# Patient Record
Sex: MALE | Race: WHITE | NOT HISPANIC OR LATINO
[De-identification: names, ages, dates, MRNs, and addresses within clinical notes are randomized per-mention and may not be internally consistent; named-entity substitution may affect disease eponyms.]

---

## 2017-01-12 PROBLEM — Z00.00 ENCOUNTER FOR PREVENTIVE HEALTH EXAMINATION: Status: ACTIVE | Noted: 2017-01-12

## 2017-07-12 ENCOUNTER — APPOINTMENT (OUTPATIENT)
Dept: ORTHOPEDIC SURGERY | Facility: CLINIC | Age: 52
End: 2017-07-12

## 2017-07-12 VITALS — BODY MASS INDEX: 22.48 KG/M2 | WEIGHT: 157 LBS | HEIGHT: 70 IN

## 2017-07-12 DIAGNOSIS — Z78.9 OTHER SPECIFIED HEALTH STATUS: ICD-10-CM

## 2017-07-12 DIAGNOSIS — Z86.79 PERSONAL HISTORY OF OTHER DISEASES OF THE CIRCULATORY SYSTEM: ICD-10-CM

## 2017-07-12 DIAGNOSIS — Z86.39 PERSONAL HISTORY OF OTHER ENDOCRINE, NUTRITIONAL AND METABOLIC DISEASE: ICD-10-CM

## 2017-07-26 ENCOUNTER — APPOINTMENT (OUTPATIENT)
Dept: ORTHOPEDIC SURGERY | Facility: CLINIC | Age: 52
End: 2017-07-26

## 2017-09-06 ENCOUNTER — APPOINTMENT (OUTPATIENT)
Dept: ORTHOPEDIC SURGERY | Facility: CLINIC | Age: 52
End: 2017-09-06
Payer: OTHER MISCELLANEOUS

## 2017-09-06 PROCEDURE — 73070 X-RAY EXAM OF ELBOW: CPT | Mod: 26,LT

## 2017-09-06 PROCEDURE — 99213 OFFICE O/P EST LOW 20 MIN: CPT

## 2017-09-07 ENCOUNTER — TRANSCRIPTION ENCOUNTER (OUTPATIENT)
Age: 52
End: 2017-09-07

## 2019-04-01 ENCOUNTER — RECORD ABSTRACTING (OUTPATIENT)
Age: 54
End: 2019-04-01

## 2019-04-25 ENCOUNTER — RESULT REVIEW (OUTPATIENT)
Age: 54
End: 2019-04-25

## 2019-04-30 ENCOUNTER — RECORD ABSTRACTING (OUTPATIENT)
Age: 54
End: 2019-04-30

## 2019-04-30 DIAGNOSIS — I77.810 THORACIC AORTIC ECTASIA: ICD-10-CM

## 2019-04-30 DIAGNOSIS — Z80.9 FAMILY HISTORY OF MALIGNANT NEOPLASM, UNSPECIFIED: ICD-10-CM

## 2019-05-07 ENCOUNTER — RECORD ABSTRACTING (OUTPATIENT)
Age: 54
End: 2019-05-07

## 2019-05-07 DIAGNOSIS — Z78.9 OTHER SPECIFIED HEALTH STATUS: ICD-10-CM

## 2019-05-07 DIAGNOSIS — S60.212A CONTUSION OF LEFT WRIST, INITIAL ENCOUNTER: ICD-10-CM

## 2019-05-07 DIAGNOSIS — S60.211A CONTUSION OF RIGHT WRIST, INITIAL ENCOUNTER: ICD-10-CM

## 2019-05-07 DIAGNOSIS — S52.125D NONDISPLACED FRACTURE OF HEAD OF LEFT RADIUS, SUBSEQUENT ENCOUNTER FOR CLOSED FRACTURE WITH ROUTINE HEALING: ICD-10-CM

## 2019-05-08 ENCOUNTER — TRANSCRIPTION ENCOUNTER (OUTPATIENT)
Age: 54
End: 2019-05-08

## 2019-05-08 ENCOUNTER — APPOINTMENT (OUTPATIENT)
Dept: CARDIOLOGY | Facility: CLINIC | Age: 54
End: 2019-05-08
Payer: COMMERCIAL

## 2019-05-08 ENCOUNTER — NON-APPOINTMENT (OUTPATIENT)
Age: 54
End: 2019-05-08

## 2019-05-08 VITALS
BODY MASS INDEX: 21.9 KG/M2 | WEIGHT: 153 LBS | HEIGHT: 70 IN | DIASTOLIC BLOOD PRESSURE: 70 MMHG | SYSTOLIC BLOOD PRESSURE: 106 MMHG

## 2019-05-08 PROCEDURE — 99244 OFF/OP CNSLTJ NEW/EST MOD 40: CPT

## 2019-05-08 PROCEDURE — 93000 ELECTROCARDIOGRAM COMPLETE: CPT

## 2019-05-08 NOTE — DISCUSSION/SUMMARY
[FreeTextEntry1] : Problem list/impression/recommendation.\par \par Aortic dilatation.\par The findings have been stable. CT scan performed 2019 shows ascending aortic aneurysm measuring 4.3x4.3 cm. No significant change from 2017.\par His last echocardiogram was in October of 2018. 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 Note that he was intolerant of a beta blocker.\par \par Dyslipidemia.\par Excellent control. Continue Lipitor.

## 2019-05-08 NOTE — REASON FOR VISIT
[FreeTextEntry1] : Mr. KIYA ISRAEL presents for cardiovascular evaluation.\par \par His problem list includes:\par Hypertension\par Dyslipidemia\par Dilated ascending aorta.\par \par His primary care physician is Doctor English

## 2019-05-08 NOTE — HISTORY OF PRESENT ILLNESS
[FreeTextEntry1] : This 54-year-old male patient is seen in cardiovascular consultation. He is seen at the patient and his primary care physician's request\par \par His problem list is as noted above.\par \par Since his last examination he reports some medical interactions. He had a quite persistent upper respiratory infection over the winter that lasted a number of months. Accordingly he was not as active and exercising. He is eager to resume.\par \par He had a colonoscopy that he reports was "fine". 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

## 2019-05-08 NOTE — PHYSICAL EXAM
[Normal Appearance] : normal appearance [General Appearance - Well Developed] : well developed [Well Groomed] : well groomed [No Deformities] : no deformities [General Appearance - In No Acute Distress] : no acute distress [General Appearance - Well Nourished] : well nourished [Eyelids - No Xanthelasma] : the eyelids demonstrated no xanthelasmas [Normal Conjunctiva] : the conjunctiva exhibited no abnormalities [Normal Oral Mucosa] : normal oral mucosa [No Oral Cyanosis] : no oral cyanosis [No Oral Pallor] : no oral pallor [Normal Jugular Venous V Waves Present] : normal jugular venous V waves present [Normal Jugular Venous A Waves Present] : normal jugular venous A waves present [No Jugular Venous Freeman A Waves] : no jugular venous freeman A waves [Exaggerated Use Of Accessory Muscles For Inspiration] : no accessory muscle use [Respiration, Rhythm And Depth] : normal respiratory rhythm and effort [Auscultation Breath Sounds / Voice Sounds] : lungs were clear to auscultation bilaterally [Murmurs] : no murmurs present [Heart Sounds] : normal S1 and S2 [Heart Rate And Rhythm] : heart rate and rhythm were normal [Abdomen Soft] : soft [Abdomen Tenderness] : non-tender [Abdomen Mass (___ Cm)] : no abdominal mass palpated [Gait - Sufficient For Exercise Testing] : the gait was sufficient for exercise testing [Abnormal Walk] : normal gait [Petechial Hemorrhages (___cm)] : no petechial hemorrhages [Cyanosis, Localized] : no localized cyanosis [Nail Clubbing] : no clubbing of the fingernails [Skin Color & Pigmentation] : normal skin color and pigmentation [] : no rash [No Venous Stasis] : no venous stasis [Skin Lesions] : no skin lesions [No Skin Ulcers] : no skin ulcer [No Xanthoma] : no  xanthoma was observed [Oriented To Time, Place, And Person] : oriented to person, place, and time [Mood] : the mood was normal [Affect] : the affect was normal [No Anxiety] : not feeling anxious

## 2019-05-08 NOTE — ADDENDUM
[FreeTextEntry1] : Instructions to staff:\par \par Send a copy of this report to the following provider(s):\par Dr. English\par \par Schedule followup:\par 6 mos with stess test.\par \par Schedule testing:\par The patient will call to schedule an echo 1-2 mos prior to the visit.\par \par \par This report was generated using Dragon Dictation. Please excuse obvious typographical errors and contact this office for any questions. Any preliminary copy of this note given to the patient at the time of this visit has not been proofread or edited. This note is a part of a shared electronic record used by our physicians and may contain information generated by other physicians in this practice in addition to your visit with this office.

## 2019-11-05 ENCOUNTER — OTHER (OUTPATIENT)
Age: 54
End: 2019-11-05

## 2019-11-06 ENCOUNTER — RX RENEWAL (OUTPATIENT)
Age: 54
End: 2019-11-06

## 2020-01-30 ENCOUNTER — APPOINTMENT (OUTPATIENT)
Dept: CARDIOLOGY | Facility: CLINIC | Age: 55
End: 2020-01-30
Payer: COMMERCIAL

## 2020-01-30 VITALS
WEIGHT: 153 LBS | BODY MASS INDEX: 21.9 KG/M2 | SYSTOLIC BLOOD PRESSURE: 110 MMHG | HEIGHT: 70 IN | DIASTOLIC BLOOD PRESSURE: 76 MMHG

## 2020-01-30 PROCEDURE — 99213 OFFICE O/P EST LOW 20 MIN: CPT | Mod: 25

## 2020-01-30 PROCEDURE — 93015 CV STRESS TEST SUPVJ I&R: CPT

## 2020-01-30 NOTE — HISTORY OF PRESENT ILLNESS
[FreeTextEntry1] : This 54 year-old male patient presents for cardiovascular evaluation.\par \par His problem list is as noted above.\par \par Since his last examination 6 months ago he reports no major events or hospitalizations.  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

## 2020-01-30 NOTE — REASON FOR VISIT
[FreeTextEntry1] : Mr. KIYA ISRAEL has the following problem list:\par \par Hypertension\par Dyslipidemia\par Dilated ascending aorta.\par \par His primary care physician is Doctor English

## 2020-01-30 NOTE — DISCUSSION/SUMMARY
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

## 2020-08-26 ENCOUNTER — RX RENEWAL (OUTPATIENT)
Age: 55
End: 2020-08-26

## 2021-02-03 ENCOUNTER — NON-APPOINTMENT (OUTPATIENT)
Age: 56
End: 2021-02-03

## 2021-02-04 ENCOUNTER — NON-APPOINTMENT (OUTPATIENT)
Age: 56
End: 2021-02-04

## 2021-02-04 ENCOUNTER — APPOINTMENT (OUTPATIENT)
Dept: CARDIOLOGY | Facility: CLINIC | Age: 56
End: 2021-02-04
Payer: COMMERCIAL

## 2021-02-04 VITALS
BODY MASS INDEX: 22.05 KG/M2 | TEMPERATURE: 97.7 F | SYSTOLIC BLOOD PRESSURE: 110 MMHG | WEIGHT: 154 LBS | HEIGHT: 70 IN | DIASTOLIC BLOOD PRESSURE: 70 MMHG

## 2021-02-04 DIAGNOSIS — Z87.448 PERSONAL HISTORY OF OTHER DISEASES OF URINARY SYSTEM: ICD-10-CM

## 2021-02-04 PROCEDURE — 99243 OFF/OP CNSLTJ NEW/EST LOW 30: CPT

## 2021-02-04 PROCEDURE — 99072 ADDL SUPL MATRL&STAF TM PHE: CPT

## 2021-02-04 PROCEDURE — 93000 ELECTROCARDIOGRAM COMPLETE: CPT

## 2021-02-04 NOTE — REASON FOR VISIT
[FreeTextEntry1] : Mr. KIYA ISRAEL has the following problem list:\par \par Hypertension\par Dyslipidemia\par Dilated ascending aorta.\par \par He has additional medical problems as noted.\par \par His primary care physician is Doctor English

## 2021-02-04 NOTE — ASSESSMENT
[FreeTextEntry1] : EKG 2/4/2021.  Sinus rhythm, within normal limits.\par EKG 5/8/19. Sinus rhythm. Within normal limits.

## 2021-02-04 NOTE — DISCUSSION/SUMMARY
[FreeTextEntry1] : Brief recommendations and follow-up: (see above for details)\par \par Patient's overall cardiovascular status is excellent.\par Blood pressure well controlled.\par Lipid well controlled, continue statin.\par He is thoracic aortic dilatation has been stable.  A follow-up echocardiogram will be ordered.\par Next routine visit 1 year.

## 2021-02-04 NOTE — HISTORY OF PRESENT ILLNESS
[FreeTextEntry1] : This 55 year-old male patient presents for cardiovascular evaluation.\par \par His problem list is as noted above.\par \par Since his last examination 1 year ago he reports no major events or hospitalizations.  He is active and exercising.  He is practicing social distancing and wearing a mask.\par \par There have been no symptoms of shortness of breath, chest discomfort, palpitation, lightheadedness, fainting.\par \par He is seen at the patient and his physician's request.\par

## 2022-02-06 ENCOUNTER — RESULT REVIEW (OUTPATIENT)
Age: 57
End: 2022-02-06

## 2022-03-11 ENCOUNTER — RX RENEWAL (OUTPATIENT)
Age: 57
End: 2022-03-11

## 2022-04-27 ENCOUNTER — NON-APPOINTMENT (OUTPATIENT)
Age: 57
End: 2022-04-27

## 2022-04-29 ENCOUNTER — NON-APPOINTMENT (OUTPATIENT)
Age: 57
End: 2022-04-29

## 2022-05-02 ENCOUNTER — APPOINTMENT (OUTPATIENT)
Dept: CARDIOLOGY | Facility: CLINIC | Age: 57
End: 2022-05-02
Payer: COMMERCIAL

## 2022-05-02 ENCOUNTER — NON-APPOINTMENT (OUTPATIENT)
Age: 57
End: 2022-05-02

## 2022-05-02 VITALS
OXYGEN SATURATION: 91 % | HEIGHT: 70 IN | TEMPERATURE: 97.8 F | BODY MASS INDEX: 22.05 KG/M2 | SYSTOLIC BLOOD PRESSURE: 100 MMHG | WEIGHT: 154 LBS | HEART RATE: 73 BPM | DIASTOLIC BLOOD PRESSURE: 70 MMHG

## 2022-05-02 PROCEDURE — 99214 OFFICE O/P EST MOD 30 MIN: CPT

## 2022-05-02 PROCEDURE — 93000 ELECTROCARDIOGRAM COMPLETE: CPT

## 2022-05-02 NOTE — CARDIOLOGY SUMMARY
[de-identified] :  Stress test 1/30/2020. No ischemia. Kevan stage IV. 12 minutes. 12.3 METS. 98%.  Double product 22,680.\par  [de-identified] : Echo 2/7/2022.  Normal LV function.  EF 65%.  Normal diastolic function.  Normal valves.  Mildly dilated aortic root, 3.8 cm.  Trace AI.  Trace TR.  PA 30.  Note: Proximal ascending aorta not well visualized.  (Known to be dilated).  Overall findings similar to 2019.\par  Echo 11/4/2019. A. F. Normal LV chamber size and function. EF 65%. Dilated aortic root,  4.0-4.6 cm and proximal ascending aorta, 4.4 cm. Trace AI. Similar to 2017.\par \par Echo. 10/16/18. Normal LV function. EF 60%. Normal diastolic function. Normal valves.  Trace MR. Trace AI. Trace TR. Normal PA, 19-24. Dilated aortic root at sinotubular  junction, 4.6 cm. Dilated proximal ascending aorta, 4.2 cm. \par  [de-identified] : CT noncontrast. 4/25/19. Ascending aortic aneurysm 4.3x4.3, minimal change from  2/14/17 (4x4.1). Aortic root dilatation 4.4 cm unchanged.\par Chest CT 2/14/17 4.1x4 cm aneurysmal dilatation of the ascending aorta.\par

## 2022-05-02 NOTE — REVIEW OF SYSTEMS
[Negative] : Heme/Lymph [FreeTextEntry4] : Reported moderate left-sided hearing loss. [FreeTextEntry5] : See HPI. [FreeTextEntry6] : He had a home sleep study that showed no sleep apnea. [FreeTextEntry8] : Nocturia x1 or 2.  No ED.

## 2022-05-02 NOTE — REASON FOR VISIT
[FreeTextEntry1] : Mr. KIYA ISRAEL has the following problem list:\par \par Hypertension\par Dyslipidemia\par Dilated ascending aorta.\par \par He has additional medical problems as noted.\par \par His primary care physician is Doctor English\par \par He is a volunteer interior .

## 2022-05-02 NOTE — ASSESSMENT
[FreeTextEntry1] : EKG 5/2/2022.  Sinus rhythm, within normal limits.\par EKG 2/4/2021.  Sinus rhythm, within normal limits.\par EKG 5/8/19. Sinus rhythm. Within normal limits.

## 2022-05-02 NOTE — DISCUSSION/SUMMARY
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  We will continue with periodic surveillance imaging.\par The patient has been advised regarding appropriate warning signs.\par He will work on his therapeutic lifestyle treatment.\par We will plan for imaging, likely a noncontrast CT scan before his next visit.  He will call me around the time of his birthday.\par Next routine visit 1 year.

## 2022-05-02 NOTE — HISTORY OF PRESENT ILLNESS
[FreeTextEntry1] : This 57 year-old male patient presents for cardiovascular evaluation.\par \par His problem list is as noted above.\par \par Since his last examination more than 1 year ago he reports no major events or hospitalizations.  He admits to being more sedentary than he should be.  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  The findings were satisfactory and stable as noted.\par \par Patient is seen at the patient and his primary care physician's request.\par

## 2023-05-07 ENCOUNTER — RX RENEWAL (OUTPATIENT)
Age: 58
End: 2023-05-07

## 2023-05-10 ENCOUNTER — NON-APPOINTMENT (OUTPATIENT)
Age: 58
End: 2023-05-10

## 2023-05-15 ENCOUNTER — NON-APPOINTMENT (OUTPATIENT)
Age: 58
End: 2023-05-15

## 2023-05-15 ENCOUNTER — APPOINTMENT (OUTPATIENT)
Dept: CARDIOLOGY | Facility: CLINIC | Age: 58
End: 2023-05-15
Payer: COMMERCIAL

## 2023-05-15 VITALS
DIASTOLIC BLOOD PRESSURE: 82 MMHG | BODY MASS INDEX: 22.19 KG/M2 | HEIGHT: 70 IN | SYSTOLIC BLOOD PRESSURE: 120 MMHG | WEIGHT: 155 LBS

## 2023-05-15 DIAGNOSIS — E78.5 HYPERLIPIDEMIA, UNSPECIFIED: ICD-10-CM

## 2023-05-15 DIAGNOSIS — I10 ESSENTIAL (PRIMARY) HYPERTENSION: ICD-10-CM

## 2023-05-15 DIAGNOSIS — Z82.49 FAMILY HISTORY OF ISCHEMIC HEART DISEASE AND OTHER DISEASES OF THE CIRCULATORY SYSTEM: ICD-10-CM

## 2023-05-15 PROCEDURE — 36415 COLL VENOUS BLD VENIPUNCTURE: CPT

## 2023-05-15 PROCEDURE — 93000 ELECTROCARDIOGRAM COMPLETE: CPT

## 2023-05-15 PROCEDURE — 99214 OFFICE O/P EST MOD 30 MIN: CPT

## 2023-05-15 RX ORDER — TOBRAMYCIN AND DEXAMETHASONE 3; 1 MG/ML; MG/ML
0.3-0.1 SUSPENSION/ DROPS OPHTHALMIC
Qty: 5 | Refills: 0 | Status: DISCONTINUED | COMMUNITY
Start: 2023-03-16

## 2023-05-15 NOTE — REVIEW OF SYSTEMS
[FreeTextEntry4] : Reported moderate left-sided hearing loss. [FreeTextEntry5] : See HPI. [FreeTextEntry6] : He had a home sleep study that showed no sleep apnea. [FreeTextEntry8] : Nocturia x1 or 2.  No ED. [de-identified] : He reports he is under some work and home stress.

## 2023-05-15 NOTE — ASSESSMENT
[FreeTextEntry1] : EKG 5/15/2023.  Sinus bradycardia.  Otherwise within normal limits.\par EKG 5/2/2022.  Sinus rhythm, within normal limits.\par EKG 2/4/2021.  Sinus rhythm, within normal limits.\par EKG 5/8/19. Sinus rhythm. Within normal limits.

## 2023-05-15 NOTE — DISCUSSION/SUMMARY
[FreeTextEntry1] : Brief recommendations and follow-up: (see above for details)\par \par The patient's overall cardiovascular status is stable.\par Blood pressure under good control.\par Lipids under good control with repeat laboratories ordered.\par Continue to follow aortic dilatation.  CT scan is scheduled for this weekend.\par The patient anticipates moving out of the area to [place].\par I will schedule him for a 1 year appointment which the patient can obviously change as needed if he leaves the area.

## 2023-05-15 NOTE — HISTORY OF PRESENT ILLNESS
[FreeTextEntry1] : This 58 year-old male patient presents for cardiovascular evaluation.\par \par His problem list is as noted above.\par \par Since his last examination 1 year ago he reports no major events or hospitalizations.  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  He admits that he could do more regular exercise and I encouraged him regarding this.\par \par There have been no symptoms of chest discomfort, shortness of breath, palpitation, lightheadedness, fainting.\par \par The patient reports that he does have some home and work stress.  He also reports that his family is currently living in the [facility] and he anticipates moving there within the year.  I advised him to kindly keep us posted.  He will need medical follow-up there as well.\par \par The patient reports that his mother recently had thoracic aortic aneurysm replacement.\par \par He is seen at the patient and his primary care physician's request in consultation.\par \par \par

## 2023-05-21 ENCOUNTER — LABORATORY RESULT (OUTPATIENT)
Age: 58
End: 2023-05-21

## 2023-05-21 ENCOUNTER — RESULT REVIEW (OUTPATIENT)
Age: 58
End: 2023-05-21

## 2023-05-23 DIAGNOSIS — I77.810 THORACIC AORTIC ECTASIA: ICD-10-CM

## 2023-05-23 DIAGNOSIS — Z92.89 PERSONAL HISTORY OF OTHER MEDICAL TREATMENT: ICD-10-CM

## 2023-05-23 DIAGNOSIS — Z01.89 ENCOUNTER FOR OTHER SPECIFIED SPECIAL EXAMINATIONS: ICD-10-CM

## 2024-04-05 ENCOUNTER — RX RENEWAL (OUTPATIENT)
Age: 59
End: 2024-04-05

## 2024-04-05 RX ORDER — ATORVASTATIN CALCIUM 20 MG/1
20 TABLET, FILM COATED ORAL
Qty: 90 | Refills: 3 | Status: ACTIVE | COMMUNITY
Start: 2017-03-21 | End: 1900-01-01

## 2024-04-05 RX ORDER — HYDROCHLOROTHIAZIDE 12.5 MG/1
12.5 CAPSULE ORAL
Qty: 90 | Refills: 3 | Status: ACTIVE | COMMUNITY
Start: 2017-02-20 | End: 1900-01-01

## 2024-04-05 RX ORDER — VERAPAMIL HYDROCHLORIDE 240 MG/1
240 TABLET ORAL
Qty: 90 | Refills: 3 | Status: ACTIVE | COMMUNITY
Start: 2017-03-21 | End: 1900-01-01

## 2024-04-28 NOTE — CARDIOLOGY SUMMARY
EKG 4/28/24, 0616: Sinus rhythm with short MN with 1 PVC noted, ventricular rate 72 bpm, no evidence of acute ischemia, normal axis and MN/QRS intervals, prolonged QTc 508 ms.  Compared to previous EKG, QTc prolongation has slightly improved.    Will provide additional IV magnesium supplementation given hypomagnesemia also noted on labs and continue to monitor.   [de-identified] :  Stress test 1/30/2020. No ischemia. Kevan stage IV. 12 minutes. 12.3 METS. 98%.  Double product 22,680.\par  [de-identified] : Echo 2/7/2022.  Normal LV function.  EF 65%.  Normal diastolic function.  Normal valves.  Mildly dilated aortic root, 3.8 cm.  Trace AI.  Trace TR.  PA 30.  Note: Proximal ascending aorta not well visualized.  (Known to be dilated).  Overall findings similar to 2019.\par  Echo 11/4/2019. A. F. Normal LV chamber size and function. EF 65%. Dilated aortic root,  4.0-4.6 cm and proximal ascending aorta, 4.4 cm. Trace AI. Similar to 2017.\par \par Echo. 10/16/18. Normal LV function. EF 60%. Normal diastolic function. Normal valves.  Trace MR. Trace AI. Trace TR. Normal PA, 19-24. Dilated aortic root at sinotubular  junction, 4.6 cm. Dilated proximal ascending aorta, 4.2 cm. \par  [de-identified] : CT noncontrast. 4/25/19. Ascending aortic aneurysm 4.3x4.3, minimal change from  2/14/17 (4x4.1). Aortic root dilatation 4.4 cm unchanged.\par Chest CT 2/14/17 4.1x4 cm aneurysmal dilatation of the ascending aorta.\par

## 2024-05-20 ENCOUNTER — APPOINTMENT (OUTPATIENT)
Dept: CARDIOLOGY | Facility: CLINIC | Age: 59
End: 2024-05-20